# Patient Record
Sex: MALE | Race: WHITE | NOT HISPANIC OR LATINO
[De-identification: names, ages, dates, MRNs, and addresses within clinical notes are randomized per-mention and may not be internally consistent; named-entity substitution may affect disease eponyms.]

---

## 2024-04-09 PROBLEM — Z00.00 ENCOUNTER FOR PREVENTIVE HEALTH EXAMINATION: Status: ACTIVE | Noted: 2024-04-09

## 2024-04-13 DIAGNOSIS — Z82.0 FAMILY HISTORY OF EPILEPSY AND OTHER DISEASES OF THE NERVOUS SYSTEM: ICD-10-CM

## 2024-04-13 DIAGNOSIS — Z80.3 FAMILY HISTORY OF MALIGNANT NEOPLASM OF BREAST: ICD-10-CM

## 2024-04-13 DIAGNOSIS — Z86.010 PERSONAL HISTORY OF COLONIC POLYPS: ICD-10-CM

## 2024-04-13 DIAGNOSIS — Z86.39 PERSONAL HISTORY OF OTHER ENDOCRINE, NUTRITIONAL AND METABOLIC DISEASE: ICD-10-CM

## 2024-04-13 DIAGNOSIS — Z87.2 PERSONAL HISTORY OF DISEASES OF THE SKIN AND SUBCUTANEOUS TISSUE: ICD-10-CM

## 2024-04-13 DIAGNOSIS — Z81.8 FAMILY HISTORY OF OTHER MENTAL AND BEHAVIORAL DISORDERS: ICD-10-CM

## 2024-04-13 DIAGNOSIS — Z80.0 FAMILY HISTORY OF MALIGNANT NEOPLASM OF DIGESTIVE ORGANS: ICD-10-CM

## 2024-04-13 DIAGNOSIS — Z85.46 PERSONAL HISTORY OF MALIGNANT NEOPLASM OF PROSTATE: ICD-10-CM

## 2024-04-13 RX ORDER — PANTOPRAZOLE 40 MG/1
TABLET, DELAYED RELEASE ORAL
Refills: 0 | Status: ACTIVE | COMMUNITY

## 2024-04-16 ENCOUNTER — NON-APPOINTMENT (OUTPATIENT)
Age: 69
End: 2024-04-16

## 2024-04-16 ENCOUNTER — APPOINTMENT (OUTPATIENT)
Dept: CARDIOLOGY | Facility: CLINIC | Age: 69
End: 2024-04-16
Payer: COMMERCIAL

## 2024-04-16 VITALS
SYSTOLIC BLOOD PRESSURE: 127 MMHG | BODY MASS INDEX: 24.13 KG/M2 | OXYGEN SATURATION: 98 % | HEIGHT: 74 IN | DIASTOLIC BLOOD PRESSURE: 82 MMHG | WEIGHT: 188 LBS | HEART RATE: 63 BPM

## 2024-04-16 DIAGNOSIS — R93.1 ABNORMAL FINDINGS ON DIAGNOSTIC IMAGING OF HEART AND CORONARY CIRCULATION: ICD-10-CM

## 2024-04-16 DIAGNOSIS — U07.1 COVID-19: ICD-10-CM

## 2024-04-16 DIAGNOSIS — Z87.891 PERSONAL HISTORY OF NICOTINE DEPENDENCE: ICD-10-CM

## 2024-04-16 PROCEDURE — 99204 OFFICE O/P NEW MOD 45 MIN: CPT | Mod: 25

## 2024-04-16 PROCEDURE — 93000 ELECTROCARDIOGRAM COMPLETE: CPT

## 2024-04-16 NOTE — HISTORY OF PRESENT ILLNESS
[FreeTextEntry1] : -year-old man Cardiology consultation requested because of an elevated coronary calcium score.  Mr. Alen Garner has no known heart disease.  There is no history of a myocardial infarction angina or congestive heart failure.  A screening 1/23 coronary calcium score was 97 Bereket units LAD 94 representing percentile 30.  Leonidas complains of a left-sided chest "sensation which she attributes to muscular pain.  The symptoms have been present for "years."  There is a prior history of hyperlipidemia.  [employer] a reductase inhibitor therapy has been advised but declined by the patient.  There is no history of hypertension diabetes or illicit drug use.  He smoked less than 1/2 pack of cigarettes daily stopping 35 years ago.  There is no family history of acute myocardial infarction or sudden death.  Leonidas presents today for cardiovascular evaluation he is accompanied by his wife

## 2024-04-16 NOTE — DISCUSSION/SUMMARY
[FreeTextEntry1] : Elevated coronary calcium score A screening 1/23 coronary calcium score was 97 Agatston units LAD 94 representing percentile 30.  Comment The working diagnosis is atherosclerotic heart disease with LAD involvement. The history and coronary calcium score are most consistent with this diagnosis.  Further noninvasive studies would be helpful for management decisions.  Measures to control modifiable risk factors for the development of atherosclerotic disease will be important in long-term management.  I have recommended the following: Risk factor modification Echocardiogram CT coronary angiogram Further workup and treatment will be dictated by the results of the above studies and the patient's clinical course

## 2024-04-16 NOTE — PHYSICAL EXAM
[Normal Conjunctiva] : normal conjunctiva [Normal S1, S2] : normal S1, S2 [Clear Lung Fields] : clear lung fields [Soft] : abdomen soft [Non Tender] : non-tender [Normal Bowel Sounds] : normal bowel sounds [Normal Gait] : normal gait [No Rash] : no rash [No Focal Deficits] : no focal deficits [de-identified] : Appears fit in no distress lying flat [de-identified] : No neck vein distention.  No carotid bruit.  Thyroid not palpable [de-identified] : No murmur.  No gallop.  No diastolic sounds.  Point of maximal impulse normal and not displaced. [de-identified] : Full range of motion [de-identified] : Dorsalis pedis pulses +2 bilaterally.  Feet warm and well-perfused.  No ulcerations.  No peripheral edema. [de-identified] : pleasant

## 2024-04-19 PROBLEM — U07.1 COVID-19 VIRUS INFECTION: Status: RESOLVED | Noted: 2024-04-19 | Resolved: 2024-04-19

## 2024-04-19 PROBLEM — Z87.891 FORMER SMOKER: Status: ACTIVE | Noted: 2024-04-19

## 2024-04-19 PROBLEM — R93.1 ELEVATED CORONARY ARTERY CALCIUM SCORE: Status: ACTIVE | Noted: 2024-04-16

## 2024-04-22 ENCOUNTER — APPOINTMENT (OUTPATIENT)
Dept: CARDIOLOGY | Facility: CLINIC | Age: 69
End: 2024-04-22
Payer: COMMERCIAL

## 2024-04-22 PROCEDURE — 93306 TTE W/DOPPLER COMPLETE: CPT

## 2024-04-22 PROCEDURE — 36415 COLL VENOUS BLD VENIPUNCTURE: CPT

## 2024-04-25 ENCOUNTER — NON-APPOINTMENT (OUTPATIENT)
Age: 69
End: 2024-04-25

## 2024-04-26 LAB
ALBUMIN SERPL ELPH-MCNC: 4.4 G/DL
ALP BLD-CCNC: 127 U/L
ALT SERPL-CCNC: 22 U/L
ANION GAP SERPL CALC-SCNC: 16 MMOL/L
AST SERPL-CCNC: 33 U/L
BILIRUB DIRECT SERPL-MCNC: 0.1 MG/DL
BILIRUB INDIRECT SERPL-MCNC: 0.2 MG/DL
BILIRUB SERPL-MCNC: 0.3 MG/DL
BUN SERPL-MCNC: 13 MG/DL
CALCIUM SERPL-MCNC: 10.3 MG/DL
CHLORIDE SERPL-SCNC: 103 MMOL/L
CHOLEST SERPL-MCNC: 206 MG/DL
CK SERPL-CCNC: 156 U/L
CO2 SERPL-SCNC: 21 MMOL/L
CREAT SERPL-MCNC: 0.98 MG/DL
EGFR: 83 ML/MIN/1.73M2
ERYTHROCYTE [SEDIMENTATION RATE] IN BLOOD BY WESTERGREN METHOD: 8 MM/HR
GLUCOSE SERPL-MCNC: 116 MG/DL
HCT VFR BLD CALC: 42.3 %
HDLC SERPL-MCNC: 90 MG/DL
HGB BLD-MCNC: 14.1 G/DL
LDLC SERPL CALC-MCNC: 105 MG/DL
MCHC RBC-ENTMCNC: 29.1 PG
MCHC RBC-ENTMCNC: 33.3 GM/DL
MCV RBC AUTO: 87.4 FL
NONHDLC SERPL-MCNC: 116 MG/DL
PLATELET # BLD AUTO: 230 K/UL
POTASSIUM SERPL-SCNC: 4.3 MMOL/L
PROT SERPL-MCNC: 6.9 G/DL
RBC # BLD: 4.84 M/UL
RBC # FLD: 13.8 %
SODIUM SERPL-SCNC: 140 MMOL/L
TRIGL SERPL-MCNC: 65 MG/DL
WBC # FLD AUTO: 7.44 K/UL

## 2024-05-15 ENCOUNTER — APPOINTMENT (OUTPATIENT)
Dept: CARDIOLOGY | Facility: CLINIC | Age: 69
End: 2024-05-15

## 2024-06-17 ENCOUNTER — TRANSCRIPTION ENCOUNTER (OUTPATIENT)
Age: 69
End: 2024-06-17

## 2024-06-17 ENCOUNTER — APPOINTMENT (OUTPATIENT)
Dept: CARDIOLOGY | Facility: CLINIC | Age: 69
End: 2024-06-17
Payer: COMMERCIAL

## 2024-06-17 VITALS
DIASTOLIC BLOOD PRESSURE: 76 MMHG | HEIGHT: 74 IN | OXYGEN SATURATION: 100 % | HEART RATE: 68 BPM | SYSTOLIC BLOOD PRESSURE: 135 MMHG | WEIGHT: 188 LBS | BODY MASS INDEX: 24.13 KG/M2

## 2024-06-17 DIAGNOSIS — I25.10 ATHEROSCLEROTIC HEART DISEASE OF NATIVE CORONARY ARTERY W/OUT ANGINA PECTORIS: ICD-10-CM

## 2024-06-17 DIAGNOSIS — E78.5 HYPERLIPIDEMIA, UNSPECIFIED: ICD-10-CM

## 2024-06-17 DIAGNOSIS — Z86.79 PERSONAL HISTORY OF OTHER DISEASES OF THE CIRCULATORY SYSTEM: ICD-10-CM

## 2024-06-17 DIAGNOSIS — L40.50 ARTHROPATHIC PSORIASIS, UNSPECIFIED: ICD-10-CM

## 2024-06-17 PROCEDURE — 99214 OFFICE O/P EST MOD 30 MIN: CPT

## 2024-06-18 PROBLEM — I25.10 CORONARY ARTERY DISEASE: Status: ACTIVE | Noted: 2024-04-13

## 2024-06-18 PROBLEM — E78.5 HYPERLIPIDEMIA: Status: ACTIVE | Noted: 2024-04-19

## 2024-06-18 PROBLEM — L40.50 PSORIATIC ARTHRITIS: Status: ACTIVE | Noted: 2024-06-18

## 2024-06-18 PROBLEM — Z86.79 HISTORY OF CORONARY ARTERY DISEASE: Status: RESOLVED | Noted: 2024-06-18 | Resolved: 2024-06-18

## 2024-06-18 RX ORDER — SULFASALAZINE 500 MG/1
TABLET ORAL
Refills: 0 | Status: ACTIVE | COMMUNITY

## 2024-06-18 RX ORDER — IBUPROFEN 800 MG/1
TABLET, FILM COATED ORAL
Refills: 0 | Status: ACTIVE | COMMUNITY

## 2024-06-18 NOTE — HISTORY OF PRESENT ILLNESS
[FreeTextEntry1] : 68-year-old man Routine follow-up for atherosclerotic heart disease/elevated coronary calcium score hyperlipidemia.  "I feel okay."  Leonidas denies exertional chest pain palpitations shortness of breath at rest or syncope.  He is physically active without restriction or limitation.

## 2024-06-18 NOTE — DISCUSSION/SUMMARY
[FreeTextEntry1] : Coronary artery disease/atherosclerotic heart disease Atherosclerotic heart disease is minimal and not life-threatening A screening 1/23 coronary calcium score was 97 Agatston units  (LAD 94 ) representing percentile 30.  A 4/24 CT coronary angiogram.  Revealed a total coronary calcium score of 104 represent percentile 46.  The LAD had a few scattered calcified atheromatous plaque in the proximal to midportion.  There was no evidence of significant luminal narrowing in any of the coronary arteries. Left ventricular systolic function is normal as reflected by left ventricular ejection fraction of 69% on a 4/24 echocardiogram.  In view of the lack of angina and minimal disease seen on the 4/24 CT coronary angiogram medical management is indicated and the prognosis is excellent.  Measures to control modifiable risk factors for the development of atherosclerotic disease will be important in long-term management .  I have recommended the following: Risk factor modification No further cardiac testing at this time    Hyperlipidemia Hyperlipidemia represents a risk factor for atherosclerotic heart disease.  The target LDL level with any degree of atherosclerotic disease is about 70. The ability of high HDL levels to provide protection against cardiovascular events is controversial. In 4/24 the serum cholesterol level was 206 HDL 90  and triglycerides 65 The main clinical decision involves whether or not to institute antihyperlipidemic medical therapy.  .. Nonpharmacological therapy, specifically diet and exercise are emphasized as major aspects of treatment.  I have recommended the following Low-fat low-cholesterol heart healthy diet.  Regular aerobic exercise Target LDL level to about 70 as discussed above.   Psoriatic arthritis Leonidas is being treated with high-dose nonsteroidal anti-inflammatory agents (ibuprofen 800 mg twice daily).  Concern exists for adverse effects including renal insufficiency as a result of long-term treatment.  I have recommended the following: Rheumatology reevaluation regarding long-term therapy    The diagnosis, prognosis, risks, options and alternatives were explained at length to the patient..  All questions were answered.  Issues discussed included atherosclerotic heart disease hyperlipidemia nonsteroidal anti-inflammatory therapy   noninvasive cardiac testing revascularization procedures diet and exercise Counseling and/or coordination of care Time was a significant factor for this patient encounter.  Total time spent with the patient was 30 minutes.  Greater than 50% of the time was devoted to counseling and/or coordination of care

## 2024-06-18 NOTE — PHYSICAL EXAM
[Normal Conjunctiva] : normal conjunctiva [Normal S1, S2] : normal S1, S2 [Clear Lung Fields] : clear lung fields [Soft] : abdomen soft [Non Tender] : non-tender [Normal Bowel Sounds] : normal bowel sounds [Normal Gait] : normal gait [No Rash] : no rash [No Focal Deficits] : no focal deficits [de-identified] : Appears fit in no distress lying flat [de-identified] : No neck vein distention.  No carotid bruit.  Thyroid not palpable [de-identified] : No murmur.  No gallop.  No diastolic sounds.  Point of maximal impulse normal and not displaced. [de-identified] : Full range of motion [de-identified] : Dorsalis pedis pulses +2 bilaterally.  Feet warm and well-perfused.  No ulcerations.  No peripheral edema. [de-identified] : pleasant